# Patient Record
Sex: FEMALE | Race: WHITE | NOT HISPANIC OR LATINO | URBAN - METROPOLITAN AREA
[De-identification: names, ages, dates, MRNs, and addresses within clinical notes are randomized per-mention and may not be internally consistent; named-entity substitution may affect disease eponyms.]

---

## 2023-09-25 ENCOUNTER — OFFICE VISIT (OUTPATIENT)
Age: 23
End: 2023-09-25
Payer: COMMERCIAL

## 2023-09-25 VITALS
DIASTOLIC BLOOD PRESSURE: 64 MMHG | HEIGHT: 61 IN | BODY MASS INDEX: 19.71 KG/M2 | SYSTOLIC BLOOD PRESSURE: 90 MMHG | WEIGHT: 104.4 LBS

## 2023-09-25 DIAGNOSIS — Z30.09 BIRTH CONTROL COUNSELING: ICD-10-CM

## 2023-09-25 DIAGNOSIS — Z01.419 ENCOUNTER FOR GYNECOLOGICAL EXAMINATION: Primary | ICD-10-CM

## 2023-09-25 DIAGNOSIS — Z71.85 HPV VACCINE COUNSELING: ICD-10-CM

## 2023-09-25 PROCEDURE — G0145 SCR C/V CYTO,THINLAYER,RESCR: HCPCS

## 2023-09-25 PROCEDURE — S0610 ANNUAL GYNECOLOGICAL EXAMINA: HCPCS

## 2023-09-25 NOTE — ASSESSMENT & PLAN NOTE
First pap collected today. ASCCP guidelines reviewed. STD screening offered. Pt declined - low risk  Self breast awareness encouraged. Safe sex practices and condom use encouraged.    Healthy lifestyle encouraged

## 2023-09-25 NOTE — PROGRESS NOTES
Cassandra Longoria  2000    Assessment and Plan:  Yearly exam    Encounter for gynecological examination  First pap collected today. ASCCP guidelines reviewed. STD screening offered. Pt declined - low risk  Self breast awareness encouraged. Safe sex practices and condom use encouraged. Healthy lifestyle encouraged    HPV vaccine counseling  Gardasil 9 was advised for prevention of HPV-related disease. We discussed risks/benefits, SE's/AE's at length and all questions were answered. Written info was provided for review. The patient agrees to consider and is aware she may call to schedule injection #1 at any time. Birth control counseling  -patient interested in [facility] for acne and dysmenorrhea.   -we reviewed BCM at length. Recommended OCPs, patient is concerned about potential weight gain and side effects. Written information provided. She will look over and call if interested. Bobbi Quiros was seen today for establish care and gynecologic exam.    Diagnoses and all orders for this visit:    Encounter for gynecological examination  -     Liquid-based pap, screening    Birth control counseling    HPV vaccine counseling      F/U Annually and PRN    Health Maintenance:    Last PAP: Not on file. Gardasil Vaccine Series: She has not had the Gardasil series. Subjective    CC: Yearly Exam     Cassandra Longoria is a 21 y.o. female  here for an annual exam.      Menarche: 13yo    Patient's last menstrual period was 2023. Sexual activity: She is sexually active. Contraception: condoms  STD testing:  She does not want STD testing today. non smoker, non drinker  Exercise- not regularly    New patient, here for annual exam. Would like to discuss options for acne and painful cramps with her menses. She has never been on birth control. Her menstrual cycles are regular every 28-30 days. She denies any issues with bleeding or her menses.      She denies breast concerns, abnormal vaginal discharge, vaginal itching, odor, irritation, bowel/bladder dysfunction, urinary symptoms, pelvic pain, or dyspareunia today. Family hx of breast cancer: No  Family hx of ovarian cancer: No  Family hx of colon cancer: No    History reviewed. No pertinent past medical history. History reviewed. No pertinent surgical history. There is no immunization history on file for this patient. Family History   Problem Relation Age of Onset   • Breast cancer Neg Hx    • Ovarian cancer Neg Hx    • Cervical cancer Neg Hx    • Uterine cancer Neg Hx      Social History     Tobacco Use   • Smoking status: Never   • Smokeless tobacco: Never   Vaping Use   • Vaping Use: Never used   Substance Use Topics   • Alcohol use: Never   • Drug use: Never     No current outpatient medications on file. Patient Active Problem List    Diagnosis Date Noted   • Encounter for gynecological examination 2023   • Birth control counseling 2023   • HPV vaccine counseling 2023       No Known Allergies    OB History    Para Term  AB Living   0 0 0 0 0 0   SAB IAB Ectopic Multiple Live Births   0 0 0 0 0   Obstetric Comments   Menarche 15 y.o       Vitals:    23 1356   BP: 90/64   BP Location: Left arm   Patient Position: Sitting   Weight: 47.4 kg (104 lb 6.4 oz)   Height: 5' 1" (1.549 m)     Body mass index is 19.73 kg/m². Review of Systems   Constitutional: Negative for chills and fever. Gastrointestinal: Negative for abdominal pain, constipation, diarrhea, nausea and vomiting. Genitourinary: Positive for menstrual problem (dysmenorrhea). Negative for difficulty urinating, dyspareunia, dysuria, frequency, genital sores, hematuria, pelvic pain, urgency, vaginal bleeding, vaginal discharge and vaginal pain. Musculoskeletal: Negative for back pain and myalgias. Skin: Negative for pallor and rash. +acne   Neurological: Negative for headaches. Hematological: Negative for adenopathy.    Psychiatric/Behavioral: Negative for dysphoric mood. Physical Exam  Constitutional:       General: She is not in acute distress. Appearance: Normal appearance. She is not ill-appearing. Genitourinary:      Bladder and urethral meatus normal.      No lesions in the vagina. Right Labia: No rash, tenderness, lesions or skin changes. Left Labia: No tenderness, lesions, skin changes or rash. No inguinal adenopathy present in the right or left side. No vaginal discharge, erythema, tenderness, bleeding or ulceration. Right Adnexa: not tender, not full and no mass present. Left Adnexa: not tender, not full and no mass present. Cervix is nulliparous. No cervical motion tenderness, discharge, friability, lesion, polyp or nabothian cyst.      Uterus is not enlarged, fixed or tender. No uterine mass detected. Uterus is midaxial.      No urethral prolapse, tenderness or discharge present. Bladder is not tender and urgency on palpation not present. Pelvic exam was performed with patient in the lithotomy position. Breasts:     Right: No swelling, inverted nipple, mass, nipple discharge, skin change or tenderness. Left: No swelling, inverted nipple, mass, nipple discharge, skin change or tenderness. HENT:      Head: Normocephalic and atraumatic. Eyes:      Conjunctiva/sclera: Conjunctivae normal.   Pulmonary:      Effort: Pulmonary effort is normal.   Abdominal:      General: There is no distension. Palpations: Abdomen is soft. Tenderness: There is no abdominal tenderness. Musculoskeletal:         General: Normal range of motion. Cervical back: Neck supple. Lymphadenopathy:      Upper Body:      Right upper body: No supraclavicular or axillary adenopathy. Left upper body: No supraclavicular or axillary adenopathy. Lower Body: No right inguinal adenopathy. No left inguinal adenopathy.    Neurological:      Mental Status: She is alert and oriented to person, place, and time. Skin:     General: Skin is warm and dry. Psychiatric:         Mood and Affect: Mood normal.         Behavior: Behavior normal.         Thought Content: Thought content normal.         Judgment: Judgment normal.   Vitals and nursing note reviewed.

## 2023-09-25 NOTE — PATIENT INSTRUCTIONS
Prophylactic NSAID therapy for Painful or Heavy menses   Ibuprofen or Naproxen (chose 1 or the other, do not take both), Dose as noted on the box. Typically Ibuprofen dose is 600 mg, (3 tablets) every 6-8 hours. Typically Naproxen dose is 500 mg every 12 hours. Start taking medication 2 days prior to onset of menses and continue taking through the first 3 days of menses. Make sure you take consistently this is important  You need to take with food to decrease any gastrointestinal upset effects  This is proven therapy to reduce you flow and cramping by 50 %  Life style changes that have a positive effect on painful and heavy periods are as follows   Daily physical exercise    Increase fiber, fresh fruits and vegetables in your diet    Increase daily water intake    Heating pads(do not apply directly to skin, apply over clothing or towel)   Warm Baths   Relaxation techniques, meditation, massage, yoga and mindfulness   These are all suggestion for improving your sense of frustrations with your menstrual cycle and improving your overall wellness and lifestyle   Pap Smear   AMBULATORY CARE:   A Pap smear,  or Pap test, is used to screen for cervical cancer. It is also used to find precancerous and cancerous cells of the vulva and vagina. Prepare for a Pap smear: The best time to schedule the test is right after your period stops. Do not have a Pap smear during your monthly period. During a Pap smear:   You will lie on your back and place your feet on footrests called stirrups. Your healthcare provider will gently insert a device called a speculum into your vagina. The speculum is used to open the walls of your vagina so your provider can see your cervix. Your provider will gently take cell samples from your cervix and vagina. The samples are placed in a container with liquid or on a glass slide. They are sent to a lab and examined for abnormal cells.  A test for human papillomavirus (HPV) may be done at the same time. HPV is a sexually transmitted virus that can cause changes in cervical cells. After a Pap smear:  You may have some spotting the day of your procedure. Test results: Your healthcare provider will tell you when you can expect your Pap smear results. It usually takes about 1 to 3 weeks. Normal results  mean that no cell changes were found on your cervix. You may be able to wait 3 to 5 years for your next Pap smear exam.    Unclear results  may mean they did not get a good sample of cervical tissue. Or, it may mean there are changes in your cells that look abnormal. This could be due to an infection, pregnancy, menopause, or HPV. You may need another Pap smear in 1 year. Your healthcare provider will tell you what to do next. Abnormal results  mean that cell changes were found on your cervix. This does not mean you have cervical cancer. It could mean you have inflammation or an infection. It could also mean you have HPV or cells that might develop into cancer. Your healthcare provider will explain the abnormal test result to you and go over next steps with you. He or she may recommend a colposcopy. During this procedure, a small scope with a light is used to look more closely at your cervix and vagina. How often to get a Pap smear:  Pap smears usually start at age 24 and continue until age 72. A Pap smear alone may be done every 3 years. An HPV test alone or with a Pap smear may be done every 5 years, starting at age 27. You may need Pap smears more often or after age 72 if you have any of the following:  Abnormal Pap smear result    Positive HPV test result    A history of cervical cancer, or a high risk for cervical cancer    HIV    A weak immune system    Exposure to diethylstilbestrol (SEAMUS) medicine when your mother was pregnant with you    Call your doctor if:   You have severe bleeding. It has been 3 weeks and you do not have test results.     You have questions or concerns about your condition or care. © Copyright Simran Paredes 2023 Information is for End User's use only and may not be sold, redistributed or otherwise used for commercial purposes. The above information is an  only. It is not intended as medical advice for individual conditions or treatments. Talk to your doctor, nurse or pharmacist before following any medical regimen to see if it is safe and effective for you. HPV (Human Papillomavirus)   AMBULATORY CARE:   Human Papillomavirus (HPV)  is the name for a group of viruses that can infect your skin or other parts of your body. HPV is the most common infection spread by sexual contact. It can also be spread from a mother to her baby during delivery. Common symptoms include the following:   Painless warts in your mouth or on your genitals    Genital or anal discharge, bleeding, itching, or pain    Pain when you urinate    Call your doctor if:   You have new or worsening symptoms. You have questions or concerns about your condition or care. HPV diagnosis:  Your healthcare provider may use a vinegar liquid to help diagnose HPV genital warts. Women 27to 72years old can be checked for HPV during regular cervical cancer screenings. An HPV test checks for certain types of HPV that can cause changes in cervical cells. Without treatment, the changed cells can become cancer. An HPV test can be done every 5 years if the results show no infection. The test can be done with or without a Pap smear. A Pap smear checks for cancer or for abnormal cells that can become cancer. You may be tested for HPV if you have mouth or throat cancer. Treatment:  HPV cannot be cured, but an infection may go away on its own in about 2 years without causing problems. If the infection continues, some types of HPV can lead to health conditions that need to be treated. Examples include warts and certain cancers, especially squamous cell carcinoma (SCC).  HPV-linked SCCs commonly develop in the anus, throat (called oropharyngeal cancer), cervix, vagina, penis, or mouth. HPV can also cause a type of cervical cancer called adenocarcinoma. Symptoms of any of these conditions may not develop for several years after you were exposed to HPV. You will need to be monitored closely. Ask your healthcare provider for more information about monitoring, conditions caused by HPV, and available treatments. Prevent an HPV infection:  HPV is usually spread through sexual activity. The following can help prevent infection:  Ask about the HPV vaccine. The HPV vaccine is given to females and males, usually at 6or 15years of age. It can be given from 9 years through 39years of age, if needed. It is most effective if given before sexual activity begins. Use a new condom, contraceptive barrier, or dental dam each time you have sex. This includes oral, vaginal, and anal sex. Talk to your healthcare provider if you have any questions about what to use or how to use it. Follow up with your doctor as directed:  Write down your questions so you remember to ask them during your visits. © Copyright [employer] 2023 Information is for End User's use only and may not be sold, redistributed or otherwise used for commercial purposes. The above information is an  only. It is not intended as medical advice for individual conditions or treatments. Talk to your doctor, nurse or pharmacist before following any medical regimen to see if it is safe and effective for you.

## 2023-09-25 NOTE — ASSESSMENT & PLAN NOTE
-patient interested in [facility] for acne and dysmenorrhea.   -we reviewed BCM at length. Recommended OCPs, patient is concerned about potential weight gain and side effects. Written information provided. She will look over and call if interested.

## 2023-09-25 NOTE — ASSESSMENT & PLAN NOTE
Gardasil 9 was advised for prevention of HPV-related disease. We discussed risks/benefits, SE's/AE's at length and all questions were answered. Written info was provided for review. The patient agrees to consider and is aware she may call to schedule injection #1 at any time.

## 2023-10-03 LAB
LAB AP GYN PRIMARY INTERPRETATION: NORMAL
Lab: NORMAL

## 2023-11-24 PROBLEM — Z01.419 ENCOUNTER FOR GYNECOLOGICAL EXAMINATION: Status: RESOLVED | Noted: 2023-09-25 | Resolved: 2023-11-24

## 2024-04-23 ENCOUNTER — OFFICE VISIT (OUTPATIENT)
Dept: FAMILY MEDICINE CLINIC | Facility: CLINIC | Age: 24
End: 2024-04-23
Payer: COMMERCIAL

## 2024-04-23 VITALS
SYSTOLIC BLOOD PRESSURE: 102 MMHG | DIASTOLIC BLOOD PRESSURE: 60 MMHG | HEART RATE: 94 BPM | BODY MASS INDEX: 19.63 KG/M2 | OXYGEN SATURATION: 98 % | WEIGHT: 104 LBS | HEIGHT: 61 IN

## 2024-04-23 DIAGNOSIS — G43.101 MIGRAINE WITH AURA AND WITH STATUS MIGRAINOSUS, NOT INTRACTABLE: ICD-10-CM

## 2024-04-23 DIAGNOSIS — K59.00 CONSTIPATION, UNSPECIFIED CONSTIPATION TYPE: ICD-10-CM

## 2024-04-23 DIAGNOSIS — Z13.1 SCREENING FOR DIABETES MELLITUS: ICD-10-CM

## 2024-04-23 DIAGNOSIS — Z00.00 ANNUAL PHYSICAL EXAM: Primary | ICD-10-CM

## 2024-04-23 DIAGNOSIS — H61.23 HEARING LOSS OF BOTH EARS DUE TO CERUMEN IMPACTION: ICD-10-CM

## 2024-04-23 DIAGNOSIS — Z11.3 SCREENING FOR STDS (SEXUALLY TRANSMITTED DISEASES): ICD-10-CM

## 2024-04-23 DIAGNOSIS — E55.9 VITAMIN D DEFICIENCY: ICD-10-CM

## 2024-04-23 DIAGNOSIS — F41.9 ANXIETY: ICD-10-CM

## 2024-04-23 DIAGNOSIS — Z13.6 SCREENING FOR CARDIOVASCULAR CONDITION: ICD-10-CM

## 2024-04-23 PROCEDURE — 87591 N.GONORRHOEAE DNA AMP PROB: CPT

## 2024-04-23 PROCEDURE — 99385 PREV VISIT NEW AGE 18-39: CPT

## 2024-04-23 PROCEDURE — 87491 CHLMYD TRACH DNA AMP PROBE: CPT

## 2024-04-23 PROCEDURE — 69210 REMOVE IMPACTED EAR WAX UNI: CPT

## 2024-04-23 RX ORDER — ONDANSETRON 4 MG/1
4 TABLET, FILM COATED ORAL EVERY 8 HOURS PRN
Qty: 20 TABLET | Refills: 0 | Status: SHIPPED | OUTPATIENT
Start: 2024-04-23

## 2024-04-23 RX ORDER — SUMATRIPTAN 50 MG/1
TABLET, FILM COATED ORAL
Qty: 10 TABLET | Refills: 5 | Status: SHIPPED | OUTPATIENT
Start: 2024-04-23 | End: 2024-04-25

## 2024-04-23 NOTE — ASSESSMENT & PLAN NOTE
Magnesium 400 mg, Riboflavin (B2) 400 mg,COQ10. Stay well hydrated 2000ml/day H2O. Get 7-8 hr of sleep.   Has a combination of cluster and tension migraines, sometimes Excedrin migraine is useful but not always. Recommend migraine diary, will send Imitrex for onset of migraine, follow up in 1 month.

## 2024-04-23 NOTE — PATIENT INSTRUCTIONS
Problem List Items Addressed This Visit          Cardiovascular and Mediastinum    Migraine with aura and with status migrainosus, not intractable     Magnesium 400 mg, Riboflavin (B2) 400 mg,COQ10. Stay well hydrated 2000ml/day H2O. Get 7-8 hr of sleep.   Has a combination of cluster and tension migraines, sometimes Excedrin migraine is useful but not always. Recommend migraine diary, will send Imitrex for onset of migraine, follow up in 1 month.            Relevant Medications    SUMAtriptan (IMITREX) 50 mg tablet    ondansetron (ZOFRAN) 4 mg tablet       Behavioral Health    Anxiety     Will refer to behavioral health, mostly related to medical conditions. Will continue to monitor.          Relevant Orders    Ambulatory referral to Psych Services       Other    Constipation     Recommend benefiber with probiotic, 1-2 L hydration daily. Continue well balanced diet, follow up as needed.          Relevant Orders    Food Allergy Profile    Celiac Disease Panel     Other Visit Diagnoses       Annual physical exam    -  Primary    recommend my plate, great job with active lifestyle. have fasting lab work, will call with results    Screening for diabetes mellitus        recommend my plate, great job with active lifestyle. have fasting lab work, will call with results    Screening for cardiovascular condition        recommend my plate, great job with active lifestyle. have fasting lab work, will call with results    Relevant Orders    Lipid panel    Hemoglobin A1C    CBC and Platelet    Comprehensive metabolic panel    TSH, 3rd generation with Free T4 reflex    Vitamin D deficiency        Have lab work, will call with results    Relevant Orders    Vitamin D 25 hydroxy    Screening for STDs (sexually transmitted diseases)        accepts screening    Relevant Orders    Chlamydia/GC amplified DNA by PCR    Hearing loss of both ears due to cerumen impaction        ears successfully cleaned today          Wellness Visit for  Adults   AMBULATORY CARE:   A wellness visit  is when you see your healthcare provider to get screened for health problems. Your healthcare provider will also give you advice on how to stay healthy. Write down your questions so you remember to ask them. Ask your healthcare provider how often you should have a wellness visit.  What happens at a wellness visit:  Your healthcare provider will ask about your health, and your family history of health problems. This includes high blood pressure, heart disease, and cancer. He or she will ask if you have symptoms that concern you, if you smoke, and about your mood. You may also be asked about your intake of medicines, supplements, food, and alcohol. Any of the following may be done:  Your weight  will be checked. Your height may also be checked so your body mass index (BMI) can be calculated. Your BMI shows if you are at a healthy weight.    Your blood pressure  and heart rate will be checked. Your temperature may also be checked.    Blood and urine tests  may be done. Blood tests may be done to check your cholesterol levels. Abnormal cholesterol levels increase your risk for heart disease and stroke. You may also need a blood or urine test to check for diabetes if you are at increased risk. Urine tests may be done to look for signs of an infection or kidney disease.    A physical exam  includes checking your heartbeat and lungs with a stethoscope. Your healthcare provider may also check your skin to look for sun damage.    Screening tests  may be recommended. A screening test is done to check for diseases that may not cause symptoms. The screening tests you may need depend on your age, gender, family history, and lifestyle habits. For example, colorectal screening may be recommended if you are 50 years old or older.    Screening tests you need if you are a woman:   A Pap smear  is used to screen for cervical cancer. Pap smears are usually done every 3 to 5 years depending  on your age. You may need them more often if you have had abnormal Pap smear test results in the past. Ask your healthcare provider how often you should have a Pap smear.    A mammogram  is an x-ray of your breasts to screen for breast cancer. Experts recommend mammograms every 2 years starting at age 50 years. You may need a mammogram at age 49 years or younger if you have an increased risk for breast cancer. Talk to your healthcare provider about when you should start having mammograms and how often you need them.    Vaccines you may need:   Get an influenza vaccine  every year. The influenza vaccine protects you from the flu. Several types of viruses cause the flu. The viruses change over time, so new vaccines are made each year.    Get a tetanus-diphtheria (Td) booster vaccine  every 10 years. This vaccine protects you against tetanus and diphtheria. Tetanus is a severe infection that may cause painful muscle spasms and lockjaw. Diphtheria is a severe bacterial infection that causes a thick covering in the back of your mouth and throat.    Get a human papillomavirus (HPV) vaccine  if you are female and aged 19 to 26 or male 19 to 21 and never received it. This vaccine protects you from HPV infection. HPV is the most common infection spread by sexual contact. HPV may also cause vaginal, penile, and anal cancers.    Get a pneumococcal vaccine  if you are aged 65 years or older. The pneumococcal vaccine is an injection given to protect you from pneumococcal disease. Pneumococcal disease is an infection caused by pneumococcal bacteria. The infection may cause pneumonia, meningitis, or an ear infection.    Get a shingles vaccine  if you are 60 or older, even if you have had shingles before. The shingles vaccine is an injection to protect you from the varicella-zoster virus. This is the same virus that causes chickenpox. Shingles is a painful rash that develops in people who had chickenpox or have been exposed to the  virus.    How to eat healthy:  My Plate is a model for planning healthy meals. It shows the types and amounts of foods that should go on your plate. Fruits and vegetables make up about half of your plate, and grains and protein make up the other half. A serving of dairy is included on the side of your plate. The amount of calories and serving sizes you need depends on your age, gender, weight, and height. Examples of healthy foods are listed below:  Eat a variety of vegetables  such as dark green, red, and orange vegetables. You can also include canned vegetables low in sodium (salt) and frozen vegetables without added butter or sauces.    Eat a variety of fresh fruits , canned fruit in 100% juice, frozen fruit, and dried fruit.    Include whole grains.  At least half of the grains you eat should be whole grains. Examples include whole-wheat bread, wheat pasta, brown rice, and whole-grain cereals such as oatmeal.    Eat a variety of protein foods such as seafood (fish and shellfish), lean meat, and poultry without skin (turkey and chicken). Examples of lean meats include pork leg, shoulder, or tenderloin, and beef round, sirloin, tenderloin, and extra lean ground beef. Other protein foods include eggs and egg substitutes, beans, peas, soy products, nuts, and seeds.    Choose low-fat dairy products such as skim or 1% milk or low-fat yogurt, cheese, and cottage cheese.    Limit unhealthy fats  such as butter, hard margarine, and shortening.       Exercise:  Exercise at least 30 minutes per day on most days of the week. Some examples of exercise include walking, biking, dancing, and swimming. You can also fit in more physical activity by taking the stairs instead of the elevator or parking farther away from stores. Include muscle strengthening activities 2 days each week. Regular exercise provides many health benefits. It helps you manage your weight, and decreases your risk for type 2 diabetes, heart disease, stroke,  and high blood pressure. Exercise can also help improve your mood. Ask your healthcare provider about the best exercise plan for you.       General health and safety guidelines:   Do not smoke.  Nicotine and other chemicals in cigarettes and cigars can cause lung damage. Ask your healthcare provider for information if you currently smoke and need help to quit. E-cigarettes or smokeless tobacco still contain nicotine. Talk to your healthcare provider before you use these products.    Limit alcohol.  A drink of alcohol is 12 ounces of beer, 5 ounces of wine, or 1½ ounces of liquor.    Lose weight, if needed.  Being overweight increases your risk of certain health conditions. These include heart disease, high blood pressure, type 2 diabetes, and certain types of cancer.    Protect your skin.  Do not sunbathe or use tanning beds. Use sunscreen with a SPF 15 or higher. Apply sunscreen at least 15 minutes before you go outside. Reapply sunscreen every 2 hours. Wear protective clothing, hats, and sunglasses when you are outside.    Drive safely.  Always wear your seatbelt. Make sure everyone in your car wears a seatbelt. A seatbelt can save your life if you are in an accident. Do not use your cell phone when you are driving. This could distract you and cause an accident. Pull over if you need to make a call or send a text message.    Practice safe sex.  Use latex condoms if are sexually active and have more than one partner. Your healthcare provider may recommend screening tests for sexually transmitted infections (STIs).    Wear helmets, lifejackets, and protective gear.  Always wear a helmet when you ride a bike or motorcycle, go skiing, or play sports that could cause a head injury. Wear protective equipment when you play sports. Wear a lifejacket when you are on a boat or doing water sports.    © Copyright [employer] 2023 Information is for End User's use only and may not be sold, redistributed or otherwise used for  commercial purposes.  The above information is an  only. It is not intended as medical advice for individual conditions or treatments. Talk to your doctor, nurse or pharmacist before following any medical regimen to see if it is safe and effective for you.

## 2024-04-23 NOTE — PROGRESS NOTES
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 recommend my plate, great job with active lifestyle. have fasting lab work, will call with results      ADULT ANNUAL PHYSICAL  [facility] [address] [facility]    NAME: Angeles Mendoza  AGE: 23 y.o. SEX: female  : 2000     DATE: 2024     Assessment and Plan:     Problem List Items Addressed This Visit        Cardiovascular and Mediastinum    Migraine with aura and with status migrainosus, not intractable     Magnesium 400 mg, Riboflavin (B2) 400 mg,COQ10. Stay well hydrated 2000ml/day H2O. Get 7-8 hr of sleep.   Has a combination of cluster and tension migraines, sometimes Excedrin migraine is useful but not always. Recommend migraine diary, will send Imitrex for onset of migraine, follow up in 1 month.            Relevant Medications    SUMAtriptan (IMITREX) 50 mg tablet    ondansetron (ZOFRAN) 4 mg tablet       Behavioral Health    Anxiety     Will refer to behavioral health, mostly related to medical conditions. Will continue to monitor.          Relevant Orders    Ambulatory referral to Psych Services       Other    Constipation     Recommend benefiber with probiotic, 1-2 L hydration daily. Continue well balanced diet, follow up as needed.          Relevant Orders    Food Allergy Profile    Celiac Disease Panel   Other Visit Diagnoses     Annual physical exam    -  Primary    recommend my plate, great job with active lifestyle. have fasting lab work, will call with results    Screening for diabetes mellitus        recommend my plate, great job with active lifestyle. have fasting lab work, will call with results    Screening for cardiovascular condition        recommend my plate, great job with active lifestyle. have fasting lab work, will call with results    Relevant Orders    Lipid panel    Hemoglobin A1C    CBC and Platelet    Comprehensive  metabolic panel    TSH, 3rd generation with Free T4 reflex    Vitamin D deficiency        Have lab work, will call with results    Relevant Orders    Vitamin D 25 hydroxy    Screening for STDs (sexually transmitted diseases)        accepts screening    Relevant Orders    Chlamydia/GC amplified DNA by PCR    Hearing loss of both ears due to cerumen impaction        ears successfully cleaned today    Relevant Orders    Ear cerumen removal (Completed)            Immunizations and preventive care screenings were discussed with patient today. Appropriate education was printed on patient's after visit summary.    Counseling:  Alcohol/drug use: discussed moderation in alcohol intake, the recommendations for healthy alcohol use, and avoidance of illicit drug use.  Dental Health: discussed importance of regular tooth brushing, flossing, and dental visits.  Injury prevention: discussed safety/seat belts, safety helmets, smoke detectors, carbon dioxide detectors, and smoking near bedding or upholstery.  Sexual health: discussed sexually transmitted diseases, partner selection, use of condoms, avoidance of unintended pregnancy, and contraceptive alternatives.  Exercise: the importance of regular exercise/physical activity was discussed. Recommend exercise 3-5 times per week for at least 30 minutes.       Depression Screening and Follow-up Plan: Patient was screened for depression during today's encounter. They screened negative with a PHQ-2 score of 0.        Return in about 4 weeks (around 5/21/2024) for Recheck.     Chief Complaint:     Chief Complaint   Patient presents with   • Physical Exam      History of Present Illness:     Adult Annual Physical   Patient here for a comprehensive physical exam. The patient reports problems - anxiety over health issues, abdominal discomfort .    Diet and Physical Activity  Diet/Nutrition: well balanced diet.   Exercise: strength training exercises, 5-7 times a week on average, and less  than 30 minutes on average.      Depression Screening  PHQ-2/9 Depression Screening    Little interest or pleasure in doing things: 0 - not at all  Feeling down, depressed, or hopeless: 0 - not at all  PHQ-2 Score: 0  PHQ-2 Interpretation: Negative depression screen       General Health  Sleep: sleeps well, gets 7-8 hours of sleep on average, and experiences daytime hypersomnolence.   Hearing: normal - bilateral.  Vision: most recent eye exam >1 year ago.   Dental: regular dental visits, brushes teeth twice daily, and flosses teeth occasionally.       /GYN Health  Follows with gynecology? yes   Last menstrual period: 2/9/24  Contraceptive method:  none .  History of STDs?: no.     Advanced Care Planning  Do you have an advanced directive? no  Do you have a durable medical power of ? no  ACP document given to the patient? no      Review of Systems:     Review of Systems   Constitutional:  Negative for chills, diaphoresis, fatigue and fever.   HENT:  Negative for congestion, ear pain, postnasal drip, rhinorrhea, sinus pressure, sinus pain and sore throat.    Eyes:  Negative for pain and visual disturbance.   Respiratory:  Negative for cough, chest tightness, shortness of breath and wheezing.    Cardiovascular:  Negative for chest pain and palpitations.   Gastrointestinal:  Positive for constipation. Negative for abdominal pain, diarrhea, nausea and vomiting.   Genitourinary:  Negative for dysuria, frequency, hematuria and urgency.   Musculoskeletal:  Negative for arthralgias, back pain and myalgias.   Skin:  Negative for color change and rash.   Neurological:  Positive for headaches. Negative for dizziness, seizures, syncope and light-headedness.   Psychiatric/Behavioral:  Negative for dysphoric mood and sleep disturbance. The patient is nervous/anxious.    All other systems reviewed and are negative.     Past Medical History:     History reviewed. No pertinent past medical history.   Past Surgical History:  "    History reviewed. No pertinent surgical history.   Social History:     Social History     Socioeconomic History   • Marital status: Single     Spouse name: None   • Number of children: None   • Years of education: None   • Highest education level: None   Occupational History   • None   Tobacco Use   • Smoking status: Never   • Smokeless tobacco: Never   Vaping Use   • Vaping status: Never Used   Substance and Sexual Activity   • Alcohol use: Never   • Drug use: Never   • Sexual activity: Yes     Birth control/protection: None   Other Topics Concern   • None   Social History Narrative   • None     Social Determinants of Health     Financial Resource Strain: Not on file   Food Insecurity: Not on file   Transportation Needs: Not on file   Physical Activity: Not on file   Stress: Not on file   Social Connections: Not on file   Intimate Partner Violence: Not on file   Housing Stability: Not on file      Family History:     Family History   Problem Relation Age of Onset   • Breast cancer Neg Hx    • Ovarian cancer Neg Hx    • Cervical cancer Neg Hx    • Uterine cancer Neg Hx       Current Medications:     Current Outpatient Medications   Medication Sig Dispense Refill   • ondansetron (ZOFRAN) 4 mg tablet Take 1 tablet (4 mg total) by mouth every 8 (eight) hours as needed for nausea or vomiting 20 tablet 0   • SUMAtriptan (IMITREX) 50 mg tablet may repeat in 2 hours if necessary 10 tablet 5     No current facility-administered medications for this visit.      Allergies:     No Known Allergies   Physical Exam:     /60   Pulse 94   Ht 5' 1\" (1.549 m)   Wt 47.2 kg (104 lb)   SpO2 98%   BMI 19.65 kg/m²     Physical Exam  Vitals and nursing note reviewed.   Constitutional:       General: She is not in acute distress.     Appearance: Normal appearance. She is well-developed. She is not ill-appearing.   HENT:      Head: Normocephalic and atraumatic.      Right Ear: Tympanic membrane, ear canal and external ear " "normal. There is no impacted cerumen.      Left Ear: Tympanic membrane, ear canal and external ear normal. There is no impacted cerumen.      Nose: Nose normal. No congestion.      Mouth/Throat:      Mouth: Mucous membranes are moist.      Pharynx: No posterior oropharyngeal erythema.   Eyes:      Extraocular Movements: Extraocular movements intact.      Conjunctiva/sclera: Conjunctivae normal.      Pupils: Pupils are equal, round, and reactive to light.   Cardiovascular:      Rate and Rhythm: Normal rate and regular rhythm.      Heart sounds: No murmur heard.  Pulmonary:      Effort: Pulmonary effort is normal. No respiratory distress.      Breath sounds: Normal breath sounds.   Abdominal:      Palpations: Abdomen is soft.      Tenderness: There is no abdominal tenderness.   Musculoskeletal:         General: No swelling.      Cervical back: Normal range of motion and neck supple.      Right lower leg: No edema.      Left lower leg: No edema.   Lymphadenopathy:      Cervical: No cervical adenopathy.   Skin:     General: Skin is warm and dry.      Capillary Refill: Capillary refill takes less than 2 seconds.   Neurological:      General: No focal deficit present.      Mental Status: She is alert.   Psychiatric:         Mood and Affect: Mood normal.         Behavior: Behavior normal.        Ear cerumen removal    Date/Time: 4/23/2024 2:20 PM    Performed by: LISSA Lanier  Authorized by: LISSA Lanier  Universal Protocol:  Consent: Verbal consent obtained.  Risks and benefits: risks, benefits and alternatives were discussed  Consent given by: patient  Time out: Immediately prior to procedure a \"time out\" was called to verify the correct patient, procedure, equipment, support staff and site/side marked as required.  Timeout called at: 4/23/2024 3:12 PM.  Patient understanding: patient states understanding of the procedure being performed  Patient consent: the patient's understanding of the procedure " matches consent given  Procedure consent: procedure consent matches procedure scheduled  Required items: required blood products, implants, devices, and special equipment available  Patient identity confirmed: verbally with patient    Patient location:  Clinic  Procedure details:     Local anesthetic:  None    Location:  L ear and R ear    Procedure type: irrigation with instrumentation      Instrumentation: curette      Approach:  External  Post-procedure details:     Complication:  None    Hearing quality:  Improved    Patient tolerance of procedure:  Tolerated well, no immediate complications        LISSA Lanier   [facility] [address]

## 2024-04-23 NOTE — ASSESSMENT & PLAN NOTE
Recommend benefiber with probiotic, 1-2 L hydration daily. Continue well balanced diet, follow up as needed.

## 2024-04-24 LAB
C TRACH DNA SPEC QL NAA+PROBE: NEGATIVE
N GONORRHOEA DNA SPEC QL NAA+PROBE: NEGATIVE

## 2024-04-25 RX ORDER — SUMATRIPTAN 50 MG/1
TABLET, FILM COATED ORAL
Qty: 10 TABLET | Refills: 5 | Status: SHIPPED | OUTPATIENT
Start: 2024-04-25

## 2024-07-12 ENCOUNTER — TELEPHONE (OUTPATIENT)
Age: 24
End: 2024-07-12

## 2024-07-12 NOTE — TELEPHONE ENCOUNTER
Patient has an appointment scheduled for 8/5 as of right now to be seen for b/c consult for her periods.              Please call to discuss if there any tips to help with period concerns.

## 2024-08-05 ENCOUNTER — TELEMEDICINE (OUTPATIENT)
Dept: OBGYN CLINIC | Facility: CLINIC | Age: 24
End: 2024-08-05
Payer: COMMERCIAL

## 2024-08-05 VITALS — BODY MASS INDEX: 19.84 KG/M2 | WEIGHT: 105 LBS

## 2024-08-05 DIAGNOSIS — G43.101 MIGRAINE WITH AURA AND WITH STATUS MIGRAINOSUS, NOT INTRACTABLE: ICD-10-CM

## 2024-08-05 DIAGNOSIS — Z30.09 ENCOUNTER FOR GENERAL COUNSELING AND ADVICE ON CONTRACEPTIVE MANAGEMENT: Primary | ICD-10-CM

## 2024-08-05 PROCEDURE — 99213 OFFICE O/P EST LOW 20 MIN: CPT | Performed by: NURSE PRACTITIONER

## 2024-08-05 RX ORDER — SULFACETAMIDE SODIUM AND SULFUR 9; 4.5 MG/G; MG/G
RINSE TOPICAL
COMMUNITY
Start: 2024-06-03

## 2024-08-05 NOTE — PROGRESS NOTES
"Virtual visit. Discuss birth control. Having regular periods but very painful. PMS symptoms. Hx migraines and sometimes has \"flashes / visual changes\" with them. She hasn't had many recently so hasn't needed her sumatriptan or zofran lately.   Weight- pt states around 105 but unable to take her weight today. Unable to obtain blood pressure but is usually normal.   " no

## 2024-08-05 NOTE — PATIENT INSTRUCTIONS
Birth control options discussed. Considering kyleena vs camilia.   Will call office once she has decided after doing her own research.   ACHES reviewed.   Aware of benefits, risks and alternatives of birth control. Aware of risk of stroke with use of estrogen with history of migraine with aura.   Exercise 150-300 minutes per week   Always condom use for STI protection.  Return to office for annual exam. 9/24.

## 2024-08-05 NOTE — PROGRESS NOTES
Virtual Regular Visit  Name: Angeles Mendoza      : 2000      MRN: [id number]  Encounter Provider: LISSA Boland  Encounter Date: 2024   Encounter department: [facility] [place]    Verification of patient location:    Patient is located at Home in the following state in which I hold an active license [place]      Encounter provider LISSA Boland    The patient was identified by name and date of birth. Angeles Mendoza was informed that this is a telemedicine visit and that the visit is being conducted through Telephone.  My office door was closed. No one else was in the room.  She acknowledged consent and understanding of privacy and security of the video platform. The patient has agreed to participate and understands they can discontinue the visit at any time.    Patient is aware this is a billable service.     History of Present Illness       Angeles Mendoza is a 23 y.o.  female who is here today for contraceptive counseling via virtual visit. Angeles was unable to log in for a video visit so ended up being a telephone visit.    She was last seen in office on 23 for her annual exam.   Last documented BP with PCP visit on 24 was 102/60.  PMH includes migraine with aura.   Does not regularly exercise.   Monthly menses x 4-5 days with mod flow. Menses is acceptable.   Angeles Mendoza is sexually active with male partner of 2 years. Denies bleeding or dryness.  Occ vaginal pain postcoital related to length of sex. She is  monogamous.   She uses condom  for contraception.  She is not interested in STD screening today.     Review of Systems  Pertinent Medical History         Medical History Reviewed by provider this encounter:  Tobacco  Allergies  Meds  Problems  Med Hx  Surg Hx  Fam Hx       Past Medical History   History reviewed. No pertinent past medical history.  History reviewed. No pertinent surgical history.  Family History   Problem  Relation Age of Onset    Autoimmune disease Mother     Hypertension Father     No Known Problems Sister     Breast cancer Neg Hx     Ovarian cancer Neg Hx     Cervical cancer Neg Hx     Uterine cancer Neg Hx      Current Outpatient Medications on File Prior to Visit   Medication Sig Dispense Refill    Loratadine (CLARITIN PO) Take by mouth      SUMAtriptan (IMITREX) 50 mg tablet Take first tablet at onset of migraine, may repeat in 2 hours if migraine persists.  Do not exceed more than 200 mg in 24 hours. (Patient not taking: Reported on 8/5/2024) 10 tablet 5    ondansetron (ZOFRAN) 4 mg tablet Take 1 tablet (4 mg total) by mouth every 8 (eight) hours as needed for nausea or vomiting (Patient not taking: Reported on 8/5/2024) 20 tablet 0    Sulfacetamide Sodium-Sulfur 9-4.5 % LIQD USE TO WASH FACE TWICE A DAY (Patient not taking: Reported on 8/5/2024)       No current facility-administered medications on file prior to visit.   No Known Allergies   Current Outpatient Medications on File Prior to Visit   Medication Sig Dispense Refill    Loratadine (CLARITIN PO) Take by mouth      SUMAtriptan (IMITREX) 50 mg tablet Take first tablet at onset of migraine, may repeat in 2 hours if migraine persists.  Do not exceed more than 200 mg in 24 hours. (Patient not taking: Reported on 8/5/2024) 10 tablet 5    ondansetron (ZOFRAN) 4 mg tablet Take 1 tablet (4 mg total) by mouth every 8 (eight) hours as needed for nausea or vomiting (Patient not taking: Reported on 8/5/2024) 20 tablet 0    Sulfacetamide Sodium-Sulfur 9-4.5 % LIQD USE TO WASH FACE TWICE A DAY (Patient not taking: Reported on 8/5/2024)       No current facility-administered medications on file prior to visit.      Social History     Tobacco Use    Smoking status: Never    Smokeless tobacco: Never   Vaping Use    Vaping status: Never Used   Substance and Sexual Activity    Alcohol use: Never    Drug use: Never    Sexual activity: Yes     Birth control/protection:  None     Objective     Wt 47.6 kg (105 lb) Comment: per pt  LMP 07/10/2024 (Exact Date)   BMI 19.84 kg/m²   Physical Exam    Assessment and Plan:    1. Encounter for general counseling and advice on contraceptive management  2. Migraine with aura and with status migrainosus, not intractable      Birth control options discussed. Considering kyleena vs camilia.   Will call office once she has decided after doing her own research.   ACHES reviewed.   Aware of benefits, risks and alternatives of birth control. Aware of risk of stroke with use of estrogen with history of migraine with aura.   Exercise 150-300 minutes per week   Always condom use for STI protection.  Return to office for annual exam. 9/24.         Visit Time  Total Visit Duration: 20 minvandana

## 2024-10-18 ENCOUNTER — TELEPHONE (OUTPATIENT)
Age: 24
End: 2024-10-18

## 2024-10-18 NOTE — TELEPHONE ENCOUNTER
"Behavioral Health Integration Screening Questionnaire     Are you aware of the referred from your [facility] Provider  : Yes     Please advise interviewee that they need to answer all questions truthfully to allow for best care, and any misrepresentations of information may affect their ability to be seen at this clinic   => Was this discussed? Yes     If Minor Child (under age 18)    Who is/are the legal guardian(s) of the child?     Is there a custody agreement? No     If \"YES\"- Custody orders must be obtained prior to scheduling the first appointment  In addition, Consent to Treatment must be signed by all legal guardians prior to scheduling the first appointment    If \"NO\"- Consent to Treatment must be signed by all legal guardians prior to scheduling the first appointment    Behavioral Health Outpatient Intake History -     Presenting Problem (in patient's own words): anxiety    Are there any communication barriers for this patient?     No                                               If yes, please describe barriers:       Are you taking any psychiatric medications? No     If \"YES\" -What are they         If \"YES\" -Who prescribes?     Has the Patient abused alcohol or other substances in the last 6 months ? No  No concerns of substance abuse are reported.     If \"YES\" -What substance, How much, How often?     If illegal substance: Refer to [facility] (for MYRANDA) or [facility] Offices.   If Alcohol in excess of 10 drinks per week:  Refer to [facility] (for MYRANDA) or [facility] Offices    ACCEPTED as a patient Yes  If \"Yes\" Appointment Date: 11/22/2024 at 11:00 am    Referred Elsewhere? No  If “Yes” - (Where? Ex: Therapy Anywhere; [facility];  [facility], etc.)       Name of Insurance Co: [facility] [employer]  Insurance ID# [id number]  Insurance Phone # [phone]  If ins is primary or secondary? Primary  If patient is a minor, parents information such as Name, D.O.B of " guarantor.

## 2024-11-14 ENCOUNTER — TELEPHONE (OUTPATIENT)
Age: 24
End: 2024-11-14

## 2024-11-14 NOTE — TELEPHONE ENCOUNTER
Pt has decided to start birth control pills. She has already had a previous consult with Flor Riggins. Pharmacy on file is correct. Please follow-up with pt.

## 2024-11-20 DIAGNOSIS — Z30.011 ENCOUNTER FOR INITIAL PRESCRIPTION OF CONTRACEPTIVE PILLS: Primary | ICD-10-CM

## 2024-11-20 RX ORDER — ACETAMINOPHEN AND CODEINE PHOSPHATE 120; 12 MG/5ML; MG/5ML
1 SOLUTION ORAL DAILY
Qty: 28 TABLET | Refills: 2 | Status: SHIPPED | OUTPATIENT
Start: 2024-11-20

## 2024-11-21 NOTE — TELEPHONE ENCOUNTER
Non-descriptive vm left for patient to call back in regards to medication and scheduling a 3 month follow up. Please go over Flor Woodruff's recommendations in regards to new birth control prescription when patient calls back.

## 2024-11-21 NOTE — TELEPHONE ENCOUNTER
Spoke with patient and she stated that she was confused because her pharmacy contacted her that her medication was ready. Advised patient that the BCP that was sent was the Mallory and I read your recommendations. Patient states that she has concerns in regards to acne, weight gain, and IBS. She states she wants a low estrogen pill. I advised her that the Mallory does not contain any estrogen. I scheduled her for an appt at 7:15 AM with you tomorrow in [place] as she has concerns. Please advise if appropriate. Thank you.   Offered and patient declined Clear bilaterally, pupils equal, round and reactive to light.

## 2024-11-21 NOTE — TELEPHONE ENCOUNTER
A prescription for kylie was sent to her pharmacy on file.    Start birth control on day one of next menses and use back up method of contraception x 7 days. Take as prescribed.   Condom use encouraged for prevention of sexually transmitted infections.    Benefits, risks and alternatives of birth control were previously discussed.  There are some drugs (such as certain anticonvulsants and antibiotics) that may decrease the contraceptive efficacy of OCPs, and she should call our office to confirm or use a backup method of contraception if taking these drugs.   Expect irregular bleeding for the first 3 months.   ACHES reviewed.     Exercise 150 minutes per week  minimum.    Return to office in 3 month (before birth control runs out) for follow up. Please schedule this appt when she is called.

## 2024-11-22 ENCOUNTER — OFFICE VISIT (OUTPATIENT)
Dept: BEHAVIORAL/MENTAL HEALTH CLINIC | Facility: CLINIC | Age: 24
End: 2024-11-22
Payer: COMMERCIAL

## 2024-11-22 DIAGNOSIS — F41.9 ANXIETY: ICD-10-CM

## 2024-11-22 PROCEDURE — 90791 PSYCH DIAGNOSTIC EVALUATION: CPT
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
## 2024-11-22 NOTE — BH CRISIS PLAN
[facility]        Pt Name: Swati Pleitez  MRN: [id number]  [place] 1944  Date of evaluation: 7/2/2021  Provider: JOSE Dominique  PCP: Gabriela Hess MD  Note Started: 12:41 AM EDT       ARIADNE. I have evaluated this patient. My supervising physician was available for consultation. CHIEF COMPLAINT       Chief Complaint   Patient presents with    Shortness of Breath     patient states she has hx sob. No change with sob. She wears 3 liter oxygen at night    Abdominal Pain     ABD pain radiates back and chest x 1 week with nausea and vomiting        HISTORY OF PRESENT ILLNESS   (Location, Timing/Onset, Context/Setting, Quality, Duration, Modifying Factors, Severity, Associated Signs and Symptoms)  Note limiting factors. Chief Complaint: Abdominal pain     Swati Pleitez is a 68 y.o. female who presents to the emergency department with a chief complaint of abdominal pain. Patient reports over the last week she has not felt like herself. Notes that she has been having abdominal discomfort and pain, does not feel as if it is in a specific location. She notes that she has shortness of breath only when she is experiencing this pain, she has a history of history of pulmonary fibrosis and feels as if her shortness of breath is at her baseline. She notes she has had some urinary frequency without dysuria. She denies fever, chest pain, confusion, nausea, vomiting, numbness, weakness, hemoptysis, calf swelling or pain. Nursing Notes were all reviewed and agreed with or any disagreements were addressed in the HPI. REVIEW OF SYSTEMS    (2-9 systems for level 4, 10 or more for level 5)     Review of Systems   Constitutional: Negative for fever. HENT: Negative for sore throat. Eyes: Negative for pain and visual disturbance. Respiratory: Positive for shortness of breath. Negative for cough.     Cardiovascular: Client Name: Angeles Mendoza       Client YOB: 2000    [place] Safety Plan      Creation Date: 11/22/24 Update Date: 11/22/24   Created By: Shahnaz Velazco Last Updated By: Shahnaz Velazco      Step 1: Warning Signs:   Warning Signs   dizziness   swirling thoughts to calm down   breathing gets fast            Step 2: Internal Coping Strategies:   Internal Coping Strategies   ues grounding/ counting   play on phone            Step 3: People and social settings that provide distraction:   Name Contact Information   mom number in phone   boyfriend/ Javier number in phone    Places   outside for a walk           Step 4: People whom I can ask for help during a crisis:      Name Contact Information    mom number in phone      Step 5: Professionals or agencies I can contact during a crisis:      Clinican/Agency Name Phone Emergency Contact    988 call or text      Shahnaz Velazco/ [place] therapist/ [email or web address] scheduling/ [phone]       Local Emergency Department Emergency Department Phone Emergency Department Address     [facility] [phone]         Crisis Phone Numbers:   Suicide Prevention Lifeline: Call or Text  988 Crisis Text Line: Text HOME to 176-487   Please note: Some [facility] do not have a separate number for Child/Adolescent specific crisis. If your county is not listed under Child/Adolescent, please call the adult number for your county      Adult Crisis Numbers: Child/Adolescent Crisis Numbers   [facility]: [phone] [facility]: [phone]   [facility]: [phone] [facility]: [phone]   [facility]: [phone] [place]: [phone]   [facility]: [phone] [place] [facility]: [phone]   [place] [facility]: [phone]   [facility]: [phone]   [facility]: [phone]   [facility]: [phone] (daytime) [phone] (after hours, weekends, holidays)     "  Step 6: Making the environment safer (plan for lethal means safety):   Patient did not identify any lethal methods: Yes     Optional: What is most important to me and worth living for?   \"The people I love\"     [facility] Safety Plan. Keshia Obregon and Miller Bowman. Used with permission of the authors.           " Negative for chest pain and leg swelling. Gastrointestinal: Positive for abdominal pain. Negative for nausea and vomiting. Genitourinary: Positive for frequency. Negative for dysuria and flank pain. Musculoskeletal: Negative for back pain and neck pain. Skin: Negative for rash. Neurological: Negative for dizziness, weakness, numbness and headaches. Psychiatric/Behavioral: Negative for confusion. Positives and Pertinent negatives as per HPI. Except as noted above in the ROS, all other systems were reviewed and negative. PAST MEDICAL HISTORY     Past Medical History:   Diagnosis Date    Arthritis     Asthma     Depression 6/28/2013    Diverticulosis of colon (without mention of hemorrhage)     Enthesopathy of hip region     left - mild    Esophageal reflux     Full dentures     Irritable bowel syndrome     Nocturia     Osteoporosis, unspecified     Other and unspecified hyperlipidemia     Postinflammatory pulmonary fibrosis (HCC)     Sialoadenitis     left    Synovial cyst of popliteal space     left knee    Thoracic or lumbosacral neuritis or radiculitis, unspecified     left - L5 - S1    Type II or unspecified type diabetes mellitus without mention of complication, not stated as uncontrolled     Unspecified essential hypertension     Wears glasses          SURGICAL HISTORY     Past Surgical History:   Procedure Laterality Date    BRONCHOSCOPY N/A 12/21/2018    BRONCHOSCOPY WITH BAL performed by Misha Corado DO at [address] [place] [address]    DR. Jean-no polyps    COLONOSCOPY  02/2016    normal-DR. Ramsay    COLONOSCOPY N/A 11/6/2020    COLONOSCOPY POLYPECTOMY SNARE/COLD BIOPSY performed by Cadne Danielle MD at [address] [facility]  9/21/2020    CT BONE MARROW BIOPSY 9/21/2020 [facility] CT    HYSTERECTOMY      partial    LIPOMA RESECTION      abdominal wall    OVARY REMOVAL      left    [address]YPECTOMY [address]       Discharge Medication List as of 7/2/2021  6:09 PM      CONTINUE these medications which have NOT CHANGED    Details   dicyclomine (BENTYL) 10 MG capsule TAKE ONE CAPSULE BY MOUTH DAILY, Disp-30 capsule, R-2Normal      losartan (COZAAR) 50 MG tablet Take 1 tablet by mouth daily, Disp-90 tablet, R-1Normal      gabapentin (NEURONTIN) 300 MG capsule TAKE ONE CAPSULE BY MOUTH DAILY, Disp-30 capsule, R-3Normal      montelukast (SINGULAIR) 10 MG tablet TAKE ONE TABLET BY MOUTH DAILY, Disp-90 tablet, R-1Normal      Lancets (ONETOUCH DELICA PLUS [id number]) MISC USE 1 LANCET TWO TIMES A DAY, Disp-100 each, R-5Normal      alendronate (FOSAMAX) 70 MG tablet TAKE 1 TABLET BY MOUTH ONCE WEEKLY ON AN EMPTY STOMACH BEFORE BREAKFAST. REMAIN UPRIGHT FOR 30 MINUTES & TAKE WITH 8 OUNCES OF WATER, Disp-12 tablet, R-3Normal      omeprazole (PRILOSEC) 40 MG delayed release capsule TAKE ONE CAPSULE BY MOUTH EVERY MORNING BEFORE BREAKFAST, Disp-30 capsule, R-5Normal      OXYGEN Inhale 3 L into the lungs nightlyHistorical Med      blood glucose monitor strips Test 2 times a day & as needed for symptoms of irregular blood glucose. Dispense sufficient amount for indicated testing frequency plus additional to accommodate PRN testing needs. , Disp-100 strip,R-3, Normal      metFORMIN (GLUCOPHAGE) 1000 MG tablet TAKE ONE TABLET BY MOUTH TWICE A DAY WITH MEALS, Disp-180 tablet,R-1Normal      predniSONE (DELTASONE) 10 MG tablet Take 1.5 tablets by mouth daily, Disp-45 KVRQMM,C-[id number]      folic acid (FOLVITE) 1 MG tablet Take 1 mg by mouth dailyHistorical Med      mometasone-formoterol (DULERA) 200-5 MCG/ACT inhaler Inhale 2 puffs into the lungs every 12 hours, Disp-1 Inhaler,R-0NO PRINT      calcium-vitamin D (OSCAL 500/200 D-3) 500-200 MG-UNIT per tablet Take 1 tablet by mouth 2 times daily, Disp-1 tablet, R-0OTC               ALLERGIES     Latex, Neda-seltzer [aspirin effervescent], and Sulfa antibiotics    FAMILYHISTORY       Family History   Problem Relation Age of Onset    Heart Disease Mother     Stroke Mother     Osteoporosis Mother     Cirrhosis Father         Liver    Osteoporosis Sister     Osteoporosis Maternal Grandmother           SOCIAL HISTORY       Social History     Tobacco Use    Smoking status: Never Smoker    Smokeless tobacco: Never Used   Vaping Use    Vaping Use: Never used   Substance Use Topics    Alcohol use: Not Currently    Drug use: Never       SCREENINGS    Veronique Coma Scale  Eye Opening: Spontaneous  Best Verbal Response: Confused (hx of memory loss )  Best Motor Response: Obeys commands  Veronique Coma Scale Score: 14        PHYSICAL EXAM    (up to 7 for level 4, 8 or more for level 5)     ED Triage Vitals   BP Temp Temp Source Pulse Resp SpO2 Height Weight   07/02/21 1301 07/02/21 1301 07/02/21 1419 07/02/21 1301 07/02/21 1301 07/02/21 1301 07/02/21 1301 07/02/21 1301   (!) 144/88 98 °F (36.7 °C) Oral 95 23 97 % 5' 3\" (1.6 m) 124 lb 1.9 oz (56.3 kg)       Physical Exam  Vitals reviewed. Constitutional:       Appearance: She is not diaphoretic. HENT:      Nose: No congestion or rhinorrhea. Eyes:      General: No scleral icterus. Conjunctiva/sclera: Conjunctivae normal.   Cardiovascular:      Rate and Rhythm: Normal rate and regular rhythm. Pulses: Normal pulses. Heart sounds: Normal heart sounds. No murmur heard. No friction rub. No gallop. Pulmonary:      Effort: Pulmonary effort is normal. No respiratory distress. Breath sounds: Normal breath sounds. No stridor. No wheezing, rhonchi or rales. Abdominal:      General: There is no distension. Palpations: Abdomen is soft. Tenderness: There is no abdominal tenderness. There is no right CVA tenderness, left CVA tenderness, guarding or rebound. Musculoskeletal:         General: Normal range of motion. Cervical back: Normal range of motion and neck supple.    Skin: Phone [phone]   LIPASE    Narrative:     Performed at:  [facility]  [address] [place]   Phone [phone]       When ordered only abnormal lab results are displayed. All other labs were within normal range or not returned as of this dictation. EKG: When ordered, EKG's are interpreted by the Emergency Department Physician in the absence of a cardiologist.  Please see their note for interpretation of EKG. RADIOLOGY:   Non-plain film images such as CT, Ultrasound and MRI are read by the radiologist. Plain radiographic images are visualized and preliminarily interpreted by the ED Provider with the below findings:        Interpretation per the Radiologist below, if available at the time of this note:    CT ABDOMEN PELVIS W IV CONTRAST Additional Contrast? None   Final Result   The appendix is not visualized. Suture material is noted adjacent to the   cecum, which may indicate previous appendectomy. No acute inflammatory abnormality is appreciated. XR CHEST PORTABLE   Final Result   No acute cardiopulmonary process demonstrated           CT ABDOMEN PELVIS W IV CONTRAST Additional Contrast? None    Result Date: 7/2/2021  EXAMINATION: CT OF THE ABDOMEN AND PELVIS WITH CONTRAST 7/2/2021 2:34 pm TECHNIQUE: CT of the abdomen and pelvis was performed with the administration of intravenous contrast. Multiplanar reformatted images are provided for review. Dose modulation, iterative reconstruction, and/or weight based adjustment of the mA/kV was utilized to reduce the radiation dose to as low as reasonably achievable.  COMPARISON: 09/16/2020 HISTORY: ORDERING SYSTEM PROVIDED HISTORY: RLQ abdominal and flank pain x 1 week TECHNOLOGIST PROVIDED HISTORY: Additional Contrast?->None Reason for exam:->RLQ abdominal and flank pain x 1 week Decision Support Exception - unselect if not a suspected or confirmed emergency medical condition->Emergency Medical Condition (MA) Reason for Exam: RLQ abdominal and flank pain x 1 week Acuity: Acute Type of Exam: Initial FINDINGS: Lower Chest: A linear calcification in the right lung base is stable. Interstitial opacities are unchanged. Organs: A portion of the liver dome is omitted, proximally the superior most 2 cm. Liver is otherwise homogeneous. Cholecystectomy clips are present. The pancreas, spleen and adrenal glands are unremarkable. The kidneys enhance symmetrically. There is no hydronephrosis. Too small to characterize lesions are noted. No specific follow-up is recommended. GI/Bowel: There is a small hiatal hernia. No dilated loops of small bowel are identified. There is diverticulosis of the sigmoid colon, without acute inflammatory changes. Appendix is not visualized. The mesenteric vasculature enhances in normal fashion. Pelvis: Urinary bladder is unremarkable. Uterus is absent. Peritoneum/Retroperitoneum: There is no adenopathy or aneurysm. Bones/Soft Tissues: Chronic severe anterior compression fracture of T11 is noted. Multilevel spondylosis. Abdominal wall is unremarkable. The appendix is not visualized. Suture material is noted adjacent to the cecum, which may indicate previous appendectomy. No acute inflammatory abnormality is appreciated. XR CHEST PORTABLE    Result Date: 7/2/2021  EXAMINATION: ONE XRAY VIEW OF THE CHEST 7/2/2021 2:26 pm COMPARISON: 09/23/2020 HISTORY: ORDERING SYSTEM PROVIDED HISTORY: Abdominal pain TECHNOLOGIST PROVIDED HISTORY: Reason for exam:->Abdominal pain Reason for Exam: SOB Acuity: Acute Type of Exam: Initial FINDINGS: Heart size and pulmonary vasculature within normal limits. Chronic linear opacities in the peripheral mid and lower lung zones having the appearance of fibrosis. No new pulmonary abnormality. Costophrenic angles sharp.   No free subdiaphragmatic air     No acute cardiopulmonary process demonstrated           PROCEDURES   Unless otherwise noted below, none     Procedures    CRITICAL CARE TIME   N/A    CONSULTS:  None      EMERGENCY DEPARTMENT COURSE and DIFFERENTIAL DIAGNOSIS/MDM:   Vitals:    Vitals:    07/02/21 1630 07/02/21 1700 07/02/21 1730 07/02/21 1800   BP: (!) 196/99 (!) 171/124 (!) 189/115 (!) 197/102   Pulse: 96 87 92 92   Resp: 24 14 22 13   Temp:    99.3 °F (37.4 °C)   TempSrc:    Oral   SpO2:  97% 96% 98%   Weight:       Height:           Patient was given the following medications:  Medications   iopamidol (ISOVUE-370) 76 % injection 75 mL (75 mLs Intravenous Given 7/2/21 1457)   cefTRIAXone (ROCEPHIN) 1000 mg IVPB in 50 mL D5W minibag (0 mg Intravenous Stopped 7/2/21 1824)           49-year-old female presents to the emergency department with a chief complaint of abdominal pain. She notes to me that she has chronic shortness of breath at this at her baseline. Her primary complaint is abdominal pain and discomfort. Due to her age, CT abdomen pelvis with IV contrast for further assessment, no evidence of emergent etiology, see radiology report above. UA with 4+ bacteria many WBCs consistent with urinary tract infection. She does not have fever, altered mental status, tachycardia concerning for sepsis. I believe patient is prudent for discharge with outpatient follow-up. Patient and  are agreeable with this plan. Dose of IV Rocephin given emergency. Given prescription for Ceftin. Instructed with primary care provider, ideally to be seen within 1 week. Instructed to return to the emergency room for new or worsening symptoms including but not limited to fevers, worsening abdominal pain, flank pain, severe nausea or vomiting, confusion, numbness, weakness, any other symptoms she is concerned about. FINAL IMPRESSION      1.  Acute cystitis without hematuria          DISPOSITION/PLAN   DISPOSITION Decision To Discharge 07/02/2021 06:38:24 PM      PATIENT REFERRED TO:  MD Enoch [place] [id number]  [phone]    Call in 1 day  Call to schedule primary care follow up, ideally to be seen within 1 week.       DISCHARGE MEDICATIONS:  Discharge Medication List as of 7/2/2021  6:09 PM      START taking these medications    Details   cefUROXime (CEFTIN) 500 MG tablet Take 1 tablet by mouth 2 times daily for 7 days, Disp-14 tablet, R-0Normal             DISCONTINUED MEDICATIONS:  Discharge Medication List as of 7/2/2021  6:09 PM                 (Please note that portions of this note were completed with a voice recognition program.  Efforts were made to edit the dictations but occasionally words are mis-transcribed.)    JOSE Cunningham (electronically signed)         JOSE Cunningham  07/04/21 3584

## 2024-11-22 NOTE — BH TREATMENT PLAN
"Outpatient Behavioral Health Psychotherapy Treatment Plan    Angeles Mendoza  2000     Date of Initial Psychotherapy Assessment: 11/22/2024   Date of Current Treatment Plan: 11/22/24  Treatment Plan Target Date: TBD  Treatment Plan Expiration Date: 5/21/2025    Diagnosis:   1. Anxiety  Ambulatory referral to Psych Services          Area(s) of Need: Anxiety and worry mostly about health    Long Term Goal 1 (in the client's own words): \"I would like to be less worried\" - have less anxiety, and no panic attacks.    Stage of Change: Preparation    Target Date for completion: TBD     Anticipated therapeutic modalities: CBT, DBT, CPT, MI, client-centered, mindfulness     People identified to complete this goal: Angeles and therapist    Objective 1: (identify the means of measuring success in meeting the objective): Over the next 6 months, Angeles and therapist will explore and work on processing situations and experiences both past and present that contribute to her anxiety.                    Objective 2: (identify the means of measuring success in meeting the objective): Over the next 6 months, Angeles will learn and verbalize understanding of how brain processes anxiety triggers (fight/flight/freeze) response, and the physiological response of her body when this occurs.             Objective 3: Over the next 6 months, Angeles will learn at least 2 grounding skills and utilize them when recognizing anxiety or panic attacks.             Objective 4: Angeles and therapist will explore areas in daily experiences that in her control, and recognize areas that are not in her control.             Objective 5: Angeles will learn and utilize at least 3 DBT distress tolerance skills over the next 6 months when she is experiencing feelings of anxiety and depression.             Objective 6: Over the next 6 months, Angeles will learn common thought pattern errors (cognitive distortions) and write these down to review when she " "recognizes them in her thinking.            Objective 7: Angeles will learn and utilize thought trial technique for cognitive distortions as she recognizes them over the next 6 months.             Objective 8: Angeles will utilize a daily gratitude journal over the next 6 months.         Long Term Goal 2 (in the client's own words): \"I would like to have more confidence.    Stage of Change: Preparation    Target Date for completion: TBD     Anticipated therapeutic modalities: DBT, CBT, CPT, MI, client-centered     People identified to complete this goal: Angeles and therapist      Objective 1: (identify the means of measuring success in meeting the objective): Over the next 6 months, Angeles and therapist will explore situations, both past and present, where she experiences feelings of a lack of confidence or self-esteem.      Objective 2: (identify the means of measuring success in meeting the objective): Over the next 6 months, Angeles will learn and utilize assertive communication skills including \"I statements\" and DBT skills.    Objective 3: Angeles will utilize a daily journal of positive statements about herself over the next 6 months.    Objective 4: Angeles and therapist will develop a menu of self-care activities she enjoys and utilize this for her to pick from list daily.       I am currently under the care of a [facility] psychiatric provider: no    My [facility] psychiatric provider is: n/a    I am currently taking psychiatric medications: No    I feel that I will be ready for discharge from mental health care when I reach the following (measurable goal/objective): When there isn't anxiety or thoughts about it.    For children and adults who have a legal guardian:   Has there been any change to custody orders and/or guardianship status? NA. If yes, attach updated documentation.    I have created my Crisis Plan and have been offered a copy of this plan    Behavioral Health Treatment Plan [facility]: Diagnosis and " Treatment Plan explained to Angeles Mendoza acknowledges an understanding of their diagnosis. Angeles Mendoza agrees to this treatment plan.    I have been offered a copy of this Treatment Plan. yes

## 2024-12-02 ENCOUNTER — TELEPHONE (OUTPATIENT)
Age: 24
End: 2024-12-02

## 2024-12-02 NOTE — TELEPHONE ENCOUNTER
Patient called in to reschedule their [facility] appointment.    Writer transferred caller to the [facility] line for further assistance.

## 2024-12-03 ENCOUNTER — SOCIAL WORK (OUTPATIENT)
Dept: BEHAVIORAL/MENTAL HEALTH CLINIC | Facility: CLINIC | Age: 24
End: 2024-12-03
Payer: COMMERCIAL

## 2024-12-03 DIAGNOSIS — F41.9 ANXIETY: Primary | ICD-10-CM

## 2024-12-03 PROCEDURE — 90837 PSYTX W PT 60 MINUTES: CPT

## 2024-12-03 NOTE — PSYCH
"Behavioral Health Psychotherapy Progress Note    Psychotherapy Provided: Individual Psychotherapy     1. Anxiety            Goals addressed in session: Goal 1 and Goal 2     DATA: Angeles presents today casually dressed and in a pleasant mood. We processed her past week or two. We reviewed grounding with 5 senses technique that we discussed at initial assessment visit. She reports no panic attacks since then.  Discussed how the brain processes fight/flight/freeze response and physiological response of the body. Today introduced the idea of thought trial for cognitive distortions and also thought pattern errors that are common. Provided handouts for these for her to review and  a thought trial worksheet along with a mindfulness meditation.  During this session, this clinician used the following therapeutic modalities: Client-centered Therapy, Cognitive Behavioral Therapy, Cognitive Processing Therapy, Dialectical Behavior Therapy, Mindfulness-based Strategies, and Motivational Interviewing    Substance Abuse was not addressed during this session. If the client is diagnosed with a co-occurring substance use disorder, please indicate any changes in the frequency or amount of use: n/a. Stage of change for addressing substance use diagnoses: No substance use/Not applicable    ASSESSMENT:  Angeles Mendoza presents with a Euthymic/ normal mood.     her affect is Normal range and intensity, which is congruent, with her mood and the content of the session. The client has made progress on their goals.     Angeles Mendoza presents with a none risk of suicide, none risk of self-harm, and none risk of harm to others.    For any risk assessment that surpasses a \"low\" rating, a safety plan must be developed.    A safety plan was indicated: no  If yes, describe in detail n/a    PLAN: Between sessions, Angeles Mendoza will review handouts, write down any stuck thoughts/cognitive distortions. At the next session, the therapist will use " Client-centered Therapy, Cognitive Behavioral Therapy, Cognitive Processing Therapy, Dialectical Behavior Therapy, Mindfulness-based Strategies, and Motivational Interviewing to address anxiety.    Behavioral Health Treatment Plan and Discharge Planning: Angeles Mendoza is aware of and agrees to continue to work on their treatment plan. They have identified and are working toward their discharge goals. yes    Visit start and stop times:    12/03/24  Start Time: 0805  Stop Time: 0858  Total Visit Time: 53 minutes

## 2024-12-23 DIAGNOSIS — Z30.011 ENCOUNTER FOR INITIAL PRESCRIPTION OF CONTRACEPTIVE PILLS: ICD-10-CM

## 2024-12-24 RX ORDER — ACETAMINOPHEN AND CODEINE PHOSPHATE 120; 12 MG/5ML; MG/5ML
1 SOLUTION ORAL DAILY
Qty: 84 TABLET | Refills: 1 | Status: SHIPPED | OUTPATIENT
Start: 2024-12-24

## 2024-12-28 ENCOUNTER — OFFICE VISIT (OUTPATIENT)
Age: 24
End: 2024-12-28
Payer: COMMERCIAL

## 2024-12-28 VITALS
SYSTOLIC BLOOD PRESSURE: 100 MMHG | DIASTOLIC BLOOD PRESSURE: 63 MMHG | HEART RATE: 130 BPM | OXYGEN SATURATION: 100 % | BODY MASS INDEX: 20.1 KG/M2 | TEMPERATURE: 100.5 F | RESPIRATION RATE: 18 BRPM | WEIGHT: 106.4 LBS

## 2024-12-28 DIAGNOSIS — B34.9 VIRAL SYNDROME: Primary | ICD-10-CM

## 2024-12-28 PROCEDURE — 87636 SARSCOV2 & INF A&B AMP PRB: CPT | Performed by: PHYSICIAN ASSISTANT

## 2024-12-28 PROCEDURE — 99213 OFFICE O/P EST LOW 20 MIN: CPT | Performed by: PHYSICIAN ASSISTANT

## 2024-12-28 RX ORDER — BROMPHENIRAMINE MALEATE, PSEUDOEPHEDRINE HYDROCHLORIDE, AND DEXTROMETHORPHAN HYDROBROMIDE 2; 30; 10 MG/5ML; MG/5ML; MG/5ML
10 SYRUP ORAL 4 TIMES DAILY PRN
Qty: 120 ML | Refills: 0 | Status: SHIPPED | OUTPATIENT
Start: 2024-12-28

## 2024-12-28 RX ORDER — OSELTAMIVIR PHOSPHATE 75 MG/1
75 CAPSULE ORAL EVERY 12 HOURS SCHEDULED
Qty: 10 CAPSULE | Refills: 0 | Status: SHIPPED | OUTPATIENT
Start: 2024-12-28 | End: 2025-01-02

## 2024-12-28 NOTE — PROGRESS NOTES
[facility]        NAME: Angeles Mendoza is a 24 y.o. female  : 2000    MRN: [id number]  DATE: 2024  TIME: 4:53 PM    Assessment and Plan   Viral syndrome [B34.9]  1. Viral syndrome  brompheniramine-pseudoephedrine-DM 30-2-10 MG/5ML syrup    oseltamivir (TAMIFLU) 75 mg capsule    Covid/Flu-Office Collect          Explained to the patient that after 2 days suspected viral illness that sinusitis would not be bacterial at this point in time provided her literature from [employer] to read supporting this Bromfed was prescribed as well as Tamiflu for presumptive influenza      The patient and/or parent/legal guardian verbalized understanding of exam findings and   Treatment plan. We engaged in the shared decision-making process and treatment options were   discussed at length with the patient.  All questions, concerns and complaints were answered and   addressed to the patient's' and/or parent/legal guardians's satisfaction.    Patient Instructions     Patient Instructions   \              Afrin nasal spray          Follow up with PCP in 3-5 days.  Proceed to  ER if symptoms worsen.    If tests are performed, our office will contact you with results only if   changes need to made to the care plan discussed with you at the visit.   You can review your full results on [facility].     Chief Complaint     Chief Complaint   Patient presents with   • Cough     Symptoms started 2 days ago. C/o Congestion, yellow/green phlegm and fever on and off. Home covid test negative.          History of Present Illness       HPI  Patient presents complaining of 2 days of congestion yellow-green phlegm and fever on and off home COVID test was negative. Tmax was 100.6 . First day or two had body aches.    Review of Systems   Review of Systems  All other related systems reviewed with patient or accompanying historian and are negative except as noted in HPI    Current Medications        Current Outpatient Medications:   •  brompheniramine-pseudoephedrine-DM 30-2-10 MG/5ML syrup, Take 10 mL by mouth 4 (four) times a day as needed for congestion or cough, Disp: 120 mL, Rfl: 0  •  oseltamivir (TAMIFLU) 75 mg capsule, Take 1 capsule (75 mg total) by mouth every 12 (twelve) hours for 5 days, Disp: 10 capsule, Rfl: 0  •  Loratadine (CLARITIN PO), Take by mouth, Disp: , Rfl:   •  norethindrone (MICRONOR) 0.35 MG tablet, TAKE 1 TABLET BY MOUTH EVERY DAY, Disp: 84 tablet, Rfl: 1  •  ondansetron (ZOFRAN) 4 mg tablet, Take 1 tablet (4 mg total) by mouth every 8 (eight) hours as needed for nausea or vomiting (Patient not taking: Reported on 8/5/2024), Disp: 20 tablet, Rfl: 0  •  Sulfacetamide Sodium-Sulfur 9-4.5 % LIQD, USE TO WASH FACE TWICE A DAY (Patient not taking: Reported on 8/5/2024), Disp: , Rfl:   •  SUMAtriptan (IMITREX) 50 mg tablet, Take first tablet at onset of migraine, may repeat in 2 hours if migraine persists.  Do not exceed more than 200 mg in 24 hours. (Patient not taking: Reported on 8/5/2024), Disp: 10 tablet, Rfl: 5    Current Allergies     Allergies as of 12/28/2024   • (No Known Allergies)            The following portions of the patient's history were reviewed and updated as appropriate: allergies, current medications, past family history, past medical history, past social history, past surgical history and problem list.     No past medical history on file.    No past surgical history on file.    Family History   Problem Relation Age of Onset   • Autoimmune disease Mother    • Hypertension Father    • No Known Problems Sister    • Breast cancer Neg Hx    • Ovarian cancer Neg Hx    • Cervical cancer Neg Hx    • Uterine cancer Neg Hx          Medications have been verified.        Objective   /63   Pulse (!) 130   Temp 100.5 °F (38.1 °C)   Resp 18   Wt 48.3 kg (106 lb 6.4 oz)   SpO2 100%   BMI 20.10 kg/m²   No LMP recorded.       Physical Exam     Physical  "Exam  Constitutional:       General: She is not in acute distress.     Appearance: She is well-developed. She is not diaphoretic.   HENT:      Head: Normocephalic and atraumatic.      Nose: Congestion present.   Eyes:      General: No scleral icterus.     Conjunctiva/sclera: Conjunctivae normal.   Neck:      Trachea: No tracheal deviation.   Cardiovascular:      Rate and Rhythm: Normal rate and regular rhythm.      Heart sounds: Normal heart sounds. No murmur heard.  Pulmonary:      Effort: Pulmonary effort is normal. No respiratory distress.      Breath sounds: Normal breath sounds. No stridor. No wheezing, rhonchi or rales.   Musculoskeletal:      Cervical back: Normal range of motion and neck supple.   Lymphadenopathy:      Cervical: No cervical adenopathy.   Skin:     General: Skin is warm and dry.      Findings: No erythema.   Neurological:      Mental Status: She is alert and oriented to person, place, and time.   Psychiatric:         Behavior: Behavior normal.         Ortho Exam        Procedures  No Procedures performed today        Note: Portions of this record may have been created with voice recognition software. Occasional wrong word or \"sound a like\" substitutions may have occurred due to the inherent limitations of voice recognition software. Please read the chart carefully and recognize, using context, where substitutions have occurred.*      "

## 2024-12-29 ENCOUNTER — RESULTS FOLLOW-UP (OUTPATIENT)
Age: 24
End: 2024-12-29

## 2024-12-29 LAB
FLUAV RNA RESP QL NAA+PROBE: POSITIVE
FLUBV RNA RESP QL NAA+PROBE: NEGATIVE
SARS-COV-2 RNA RESP QL NAA+PROBE: NEGATIVE

## 2025-01-23 ENCOUNTER — OFFICE VISIT (OUTPATIENT)
Dept: FAMILY MEDICINE CLINIC | Facility: CLINIC | Age: 25
End: 2025-01-23
Payer: COMMERCIAL

## 2025-01-23 VITALS
BODY MASS INDEX: 20.1 KG/M2 | HEART RATE: 94 BPM | DIASTOLIC BLOOD PRESSURE: 72 MMHG | SYSTOLIC BLOOD PRESSURE: 112 MMHG | OXYGEN SATURATION: 96 % | HEIGHT: 61 IN

## 2025-01-23 DIAGNOSIS — L30.9 ECZEMA, UNSPECIFIED TYPE: ICD-10-CM

## 2025-01-23 DIAGNOSIS — H61.21 HEARING LOSS OF RIGHT EAR DUE TO CERUMEN IMPACTION: ICD-10-CM

## 2025-01-23 DIAGNOSIS — H66.90 ACUTE OTITIS MEDIA, UNSPECIFIED OTITIS MEDIA TYPE: Primary | ICD-10-CM

## 2025-01-23 PROCEDURE — 99214 OFFICE O/P EST MOD 30 MIN: CPT

## 2025-01-23 PROCEDURE — 69210 REMOVE IMPACTED EAR WAX UNI: CPT

## 2025-01-23 RX ORDER — CLINDAMYCIN PHOSPHATE 10 MG/ML
SOLUTION TOPICAL
COMMUNITY
Start: 2024-11-13

## 2025-01-23 RX ORDER — HYDROCORTISONE 25 MG/G
CREAM TOPICAL 2 TIMES DAILY
Qty: 28 G | Refills: 1 | Status: SHIPPED | OUTPATIENT
Start: 2025-01-23

## 2025-01-23 RX ORDER — CIPROFLOXACIN AND DEXAMETHASONE 3; 1 MG/ML; MG/ML
4 SUSPENSION/ DROPS AURICULAR (OTIC) 2 TIMES DAILY
Qty: 7.5 ML | Refills: 0 | Status: SHIPPED | OUTPATIENT
Start: 2025-01-23

## 2025-01-23 NOTE — PROGRESS NOTES
Name: Angeles Mendoza      : 2000      MRN: [id number]  Encounter Provider: LISSA Lanier  Encounter Date: 2025   Encounter department: [facility] [address]  :  Assessment & Plan  Acute otitis media, unspecified otitis media type  Cold after [place], continued cough and ear congestion. Will treat with abx, drops as needed for pain. Successfully cleaned today.   Orders:  •  amoxicillin-clavulanate (AUGMENTIN) 875-125 mg per tablet; Take 1 tablet by mouth every 12 (twelve) hours for 10 days  •  ciprofloxacin-dexamethasone (CIPRODEX) otic suspension; Administer 4 drops into both ears 2 (two) times a day    Hearing loss of right ear due to cerumen impaction  Successfully cleaned today.   Orders:  •  Ear cerumen removal    Eczema, unspecified type  Hydrocortisone as needed for ear itching.   Orders:  •  hydrocortisone 2.5 % cream; Apply topically 2 (two) times a day          Depression Screening and Follow-up Plan: Patient was screened for depression during today's encounter. They screened negative with a PHQ-2 score of 0.      History of Present Illness   Angeles is here for ear clogging.       Review of Systems   Constitutional:  Negative for chills, diaphoresis and fever.   HENT:  Positive for congestion and ear pain.    Respiratory:  Negative for cough, chest tightness and shortness of breath.    Cardiovascular:  Negative for chest pain and palpitations.   Gastrointestinal:  Negative for abdominal pain, constipation, diarrhea, nausea and vomiting.   Genitourinary:  Negative for dysuria, frequency and hematuria.   Musculoskeletal:  Negative for arthralgias, back pain and myalgias.   Skin:  Negative for color change and rash.   Neurological:  Positive for dizziness and headaches. Negative for seizures and syncope.   All other systems reviewed and are negative.  Ear cerumen removal    Date/Time: 2025 3:20 PM    Performed by: Bettie Zimmerman  "LISSA  Authorized by: LISSA Lanier  Universal Protocol:  procedure performed by consultantConsent: Verbal consent obtained.  Risks and benefits: risks, benefits and alternatives were discussed  Consent given by: patient  Time out: Immediately prior to procedure a \"time out\" was called to verify the correct patient, procedure, equipment, support staff and site/side marked as required.  Timeout called at: 1/23/2025 3:39 PM.  Patient understanding: patient states understanding of the procedure being performed  Patient consent: the patient's understanding of the procedure matches consent given  Required items: required blood products, implants, devices, and special equipment available  Patient identity confirmed: verbally with patient    Patient location:  Clinic  Procedure details:     Local anesthetic:  None    Location:  R ear    Procedure type: irrigation with instrumentation      Instrumentation: curette      Approach:  External  Post-procedure details:     Complication:  None    Hearing quality:  Improved    Patient tolerance of procedure:  Tolerated well, no immediate complications        Objective   /72   Pulse 94   Ht 5' 1\" (1.549 m)   SpO2 96%   BMI 20.10 kg/m²      Physical Exam      "

## 2025-03-03 ENCOUNTER — TELEMEDICINE (OUTPATIENT)
Dept: OTHER | Facility: HOSPITAL | Age: 25
End: 2025-03-03
Payer: COMMERCIAL

## 2025-03-03 DIAGNOSIS — J01.20 ACUTE NON-RECURRENT ETHMOIDAL SINUSITIS: Primary | ICD-10-CM

## 2025-03-03 PROBLEM — Z30.09 BIRTH CONTROL COUNSELING: Status: RESOLVED | Noted: 2023-09-25 | Resolved: 2025-03-03

## 2025-03-03 PROBLEM — Z71.85 HPV VACCINE COUNSELING: Status: RESOLVED | Noted: 2023-09-25 | Resolved: 2025-03-03

## 2025-03-03 PROCEDURE — 99213 OFFICE O/P EST LOW 20 MIN: CPT | Performed by: PHYSICIAN ASSISTANT

## 2025-03-03 NOTE — PROGRESS NOTES
Virtual Regular Visit  Name: Angeles Mendoza      : 2000      MRN: [id number]  Encounter Provider: Shannon D Severino, PA-C  Encounter Date: 3/3/2025   Encounter department: VIRTUAL CARE       Verification of patient location:  Patient is located at Home in the following state in which I hold an active license [place] :  Assessment & Plan  Acute non-recurrent ethmoidal sinusitis    Orders:    amoxicillin-clavulanate (AUGMENTIN) 875-125 mg per tablet; Take 1 tablet by mouth every 12 (twelve) hours for 10 days        Encounter provider Shannon D Severino, PA-C    The patient was identified by name and date of birth. Angeles Mendoza was informed that this is a telemedicine visit and that the visit is being conducted through the Epic Embedded platform. She agrees to proceed..  My office door was closed. No one else was in the room.  She acknowledged consent and understanding of privacy and security of the video platform. The patient has agreed to participate and understands they can discontinue the visit at any time.    Patient is aware this is a billable service.     History obtained from: patient  History of Present Illness     Pt reports she has a sinus infection a few weeks ago. Took amoxicillin with resolution. Now has HA in ethmoid area, congestion starting about 1-2 week ago, gradually getting worse. OTC meds without relief.      Review of Systems   Constitutional:  Negative for fever.   HENT:  Positive for congestion, sinus pressure and sinus pain. Negative for nosebleeds.    Eyes:  Negative for redness.   Respiratory:  Negative for shortness of breath.    Cardiovascular:  Negative for chest pain.   Gastrointestinal:  Negative for blood in stool.   Genitourinary:  Negative for hematuria.   Musculoskeletal:  Negative for gait problem.   Skin:  Negative for rash.   Neurological:  Positive for headaches. Negative for seizures.   Psychiatric/Behavioral:  Negative for behavioral problems.        Objective   There  were no vitals taken for this visit.    Physical Exam  Constitutional:       General: She is not in acute distress.     Appearance: Normal appearance. She is underweight. She is not toxic-appearing.   HENT:      Head: Normocephalic and atraumatic.      Nose: No rhinorrhea.      Comments: Sounds mildly congested     Mouth/Throat:      Mouth: Mucous membranes are moist.   Eyes:      Conjunctiva/sclera: Conjunctivae normal.   Pulmonary:      Effort: Pulmonary effort is normal. No respiratory distress.      Breath sounds: No wheezing (no gross audible wheeze through computer).   Musculoskeletal:      Cervical back: Normal range of motion.   Skin:     Findings: No rash (on face or neck).   Neurological:      Mental Status: She is alert.      Cranial Nerves: No dysarthria or facial asymmetry.   Psychiatric:         Mood and Affect: Mood normal.         Behavior: Behavior normal.         Visit Time  Total Visit Duration: 8 minutes not including the time spent for establishing the audio/video connection.

## 2025-03-03 NOTE — PATIENT INSTRUCTIONS
"As discussed, sinus infections are typically viral and will get better on their own in 7-10 days. You should try symptomatic relief in the meantime with OTC antihistamine (Claritin or Zyrtec), Afrin for up to 3 days then switch to Flonase, and Sudafed (behind the counter) and mucinex. You can also try sinus rinses with a neti pot or nasal lavage (be sure to use distilled water.) Sleep with a cool mist humidifier at your bedside. If your symptoms do not improve after 7-10 days, you may need antibiotics because it is more likely to be bacterial at that point.  Follow-up with your primary care physician for recheck in 2-3 business days. Go to the ER for sudden severe headache, high fevers, change in vision, seizure activity or anything else that is concerning.    [facility] Phone number is [phone] if you need assistance or have further questions  note in \"Letters\" section of [employer]ra. Can print if opened from a web browser   "

## 2025-04-14 ENCOUNTER — OFFICE VISIT (OUTPATIENT)
Dept: FAMILY MEDICINE CLINIC | Facility: CLINIC | Age: 25
End: 2025-04-14
Payer: COMMERCIAL

## 2025-04-14 VITALS
DIASTOLIC BLOOD PRESSURE: 80 MMHG | WEIGHT: 102.6 LBS | HEIGHT: 61 IN | BODY MASS INDEX: 19.37 KG/M2 | TEMPERATURE: 97.8 F | HEART RATE: 115 BPM | SYSTOLIC BLOOD PRESSURE: 100 MMHG | OXYGEN SATURATION: 100 %

## 2025-04-14 DIAGNOSIS — J01.10 ACUTE NON-RECURRENT FRONTAL SINUSITIS: Primary | ICD-10-CM

## 2025-04-14 PROCEDURE — 99213 OFFICE O/P EST LOW 20 MIN: CPT | Performed by: NURSE PRACTITIONER

## 2025-04-14 RX ORDER — METHYLPREDNISOLONE 4 MG/1
TABLET ORAL
Qty: 21 EACH | Refills: 0 | Status: SHIPPED | OUTPATIENT
Start: 2025-04-14

## 2025-04-14 NOTE — PROGRESS NOTES
Name: Angeles Mendoza      : 2000      MRN: [id number]  Encounter Provider: LISSA Mustafa  Encounter Date: 2025   Encounter department: [facility] [address]  :  Assessment & Plan  Acute non-recurrent frontal sinusitis  Advised increase hydration, steam, moist/humidified air, Vicks at night.  Advised to avoid irritants when possible.  Augmentin and Medrol Dosepak as prescribed.  Advised patient to restart Flonase as previously prescribed once completed with Medrol Dosepak.    Orders:    amoxicillin-clavulanate (AUGMENTIN) 875-125 mg per tablet; Take 1 tablet by mouth every 12 (twelve) hours for 10 days    methylPREDNISolone 4 MG tablet therapy pack; Use as directed on package           History of Present Illness   Patient presents to the office for evaluation of congestion, sinus pain.  Symptom started 4 days ago with headache and dizziness.  Day after awoke with sore throat, congestion, post-nasal drip.  Taking Zycam and Halls.  Feels congestion and sinus pain has worsened.  Began to experience cough and SOB with cough.  Denies wheezing.  Patient denies fever, chills, body aches, ear pain.      Review of Systems   Constitutional:  Negative for activity change, appetite change, chills and fever.   HENT:  Positive for congestion, postnasal drip, sinus pressure and sinus pain. Negative for ear pain, sore throat and trouble swallowing.    Eyes:  Negative for photophobia and visual disturbance.   Respiratory:  Positive for cough and shortness of breath (with cough). Negative for chest tightness.    Cardiovascular:  Negative for chest pain and palpitations.   Gastrointestinal:  Negative for nausea and vomiting.   Genitourinary:  Negative for decreased urine volume.   Musculoskeletal:  Negative for arthralgias and myalgias.   Skin:  Negative for color change and rash.   Neurological:  Positive for headaches. Negative for syncope, weakness and light-headedness.  "  Hematological:  Negative for adenopathy.       Objective   /80 (BP Location: Left arm, Patient Position: Sitting, Cuff Size: Standard)   Pulse (!) 115   Temp 97.8 °F (36.6 °C)   Ht 5' 1\" (1.549 m)   Wt 46.5 kg (102 lb 9.6 oz)   LMP 04/12/2025 (Exact Date)   SpO2 100%   BMI 19.39 kg/m²      Physical Exam  Vitals reviewed.   Constitutional:       General: She is not in acute distress.     Appearance: Normal appearance. She is not ill-appearing.   HENT:      Head: Normocephalic and atraumatic.      Right Ear: External ear normal.      Left Ear: External ear normal.      Nose: Congestion present.      Right Turbinates: Enlarged.      Left Turbinates: Not enlarged.      Right Sinus: Frontal sinus tenderness present. No maxillary sinus tenderness.      Left Sinus: Frontal sinus tenderness present. No maxillary sinus tenderness.      Mouth/Throat:      Mouth: Mucous membranes are moist.      Pharynx: Oropharynx is clear. No oropharyngeal exudate or posterior oropharyngeal erythema.   Eyes:      Conjunctiva/sclera: Conjunctivae normal.      Pupils: Pupils are equal, round, and reactive to light.   Cardiovascular:      Rate and Rhythm: Normal rate and regular rhythm.      Pulses: Normal pulses.      Heart sounds: Normal heart sounds. No murmur heard.  Pulmonary:      Effort: Pulmonary effort is normal.      Breath sounds: Normal breath sounds. No wheezing.   Musculoskeletal:         General: Normal range of motion.      Cervical back: Normal range of motion and neck supple.   Lymphadenopathy:      Cervical: No cervical adenopathy.   Skin:     General: Skin is warm and dry.   Neurological:      General: No focal deficit present.      Mental Status: She is alert and oriented to person, place, and time.   Psychiatric:         Mood and Affect: Mood normal.         Behavior: Behavior normal.         "

## 2025-06-03 ENCOUNTER — DOCUMENTATION (OUTPATIENT)
Dept: BEHAVIORAL/MENTAL HEALTH CLINIC | Facility: CLINIC | Age: 25
End: 2025-06-03

## 2025-06-03 DIAGNOSIS — F41.9 ANXIETY: Primary | ICD-10-CM

## 2025-06-03 NOTE — PROGRESS NOTES
Psychotherapy Discharge Summary    Preferred Name: Angeles Mendoza  YOB: 2000    Admission date to psychotherapy: 11/22/2024    Referred by: PCP    Presenting Problem: anxiety    Course of treatment included : individual therapy     Progress/Outcome of Treatment Goals (brief summary of course of treatment) Angeles was seen for initial eval and then came to one additional visit. She cancelled the next visit and then did not reschedule. Will d/c.    Treatment Complications (if any): see above    Treatment Progress: good    Current [facility] Psychiatric Provider: n/a    Discharge Medications include: none    Discharge Date: 12/3/2024 date of last visit.    Discharge Diagnosis:   1. Anxiety            Criteria for Discharge: did not reschedule    Patient is cleared to return to [facility] for continued treatment.    Rationale: may return to this provider if so desired    Aftercare recommendations include (include specific referral names and phone numbers, if appropriate): n/a    Prognosis: good